# Patient Record
Sex: FEMALE | Race: WHITE | NOT HISPANIC OR LATINO | URBAN - METROPOLITAN AREA
[De-identification: names, ages, dates, MRNs, and addresses within clinical notes are randomized per-mention and may not be internally consistent; named-entity substitution may affect disease eponyms.]

---

## 2017-01-01 ENCOUNTER — EMERGENCY (EMERGENCY)
Facility: HOSPITAL | Age: 77
LOS: 0 days | Discharge: HOME | End: 2017-08-22
Attending: EMERGENCY MEDICINE | Admitting: EMERGENCY MEDICINE
Payer: MEDICARE

## 2017-01-01 ENCOUNTER — EMERGENCY (EMERGENCY)
Facility: HOSPITAL | Age: 77
LOS: 0 days | Discharge: HOME | End: 2017-11-29
Attending: EMERGENCY MEDICINE | Admitting: EMERGENCY MEDICINE
Payer: MEDICARE

## 2017-01-01 VITALS
SYSTOLIC BLOOD PRESSURE: 142 MMHG | DIASTOLIC BLOOD PRESSURE: 76 MMHG | RESPIRATION RATE: 20 BRPM | OXYGEN SATURATION: 96 % | HEIGHT: 64 IN | WEIGHT: 149.91 LBS | TEMPERATURE: 98 F | HEART RATE: 88 BPM

## 2017-01-01 VITALS
RESPIRATION RATE: 18 BRPM | TEMPERATURE: 98 F | SYSTOLIC BLOOD PRESSURE: 137 MMHG | OXYGEN SATURATION: 99 % | DIASTOLIC BLOOD PRESSURE: 74 MMHG | WEIGHT: 95.02 LBS | HEART RATE: 65 BPM

## 2017-01-01 VITALS
OXYGEN SATURATION: 98 % | RESPIRATION RATE: 17 BRPM | SYSTOLIC BLOOD PRESSURE: 149 MMHG | DIASTOLIC BLOOD PRESSURE: 78 MMHG | HEART RATE: 76 BPM

## 2017-01-01 VITALS
RESPIRATION RATE: 18 BRPM | HEART RATE: 77 BPM | SYSTOLIC BLOOD PRESSURE: 155 MMHG | OXYGEN SATURATION: 98 % | TEMPERATURE: 98 F | DIASTOLIC BLOOD PRESSURE: 76 MMHG

## 2017-01-01 DIAGNOSIS — S01.81XA LACERATION WITHOUT FOREIGN BODY OF OTHER PART OF HEAD, INITIAL ENCOUNTER: ICD-10-CM

## 2017-01-01 DIAGNOSIS — I10 ESSENTIAL (PRIMARY) HYPERTENSION: ICD-10-CM

## 2017-01-01 DIAGNOSIS — S09.90XA UNSPECIFIED INJURY OF HEAD, INITIAL ENCOUNTER: ICD-10-CM
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
## 2017-01-01 LAB
APPEARANCE UR: CLEAR — SIGNIFICANT CHANGE UP
BILIRUB UR-MCNC: NEGATIVE — SIGNIFICANT CHANGE UP
COLOR SPEC: YELLOW — SIGNIFICANT CHANGE UP
CULTURE RESULTS: SIGNIFICANT CHANGE UP
DIFF PNL FLD: (no result)
GLUCOSE UR QL: NEGATIVE MG/DL — SIGNIFICANT CHANGE UP
KETONES UR-MCNC: (no result)
LEUKOCYTE ESTERASE UR-ACNC: (no result)
NITRITE UR-MCNC: NEGATIVE — SIGNIFICANT CHANGE UP
PH UR: 5 — SIGNIFICANT CHANGE UP (ref 5–8)
PROT UR-MCNC: 30 MG/DL
SP GR SPEC: 1.02 — SIGNIFICANT CHANGE UP (ref 1.01–1.02)
SPECIMEN SOURCE: SIGNIFICANT CHANGE UP
UROBILINOGEN FLD QL: 1 MG/DL

## 2017-01-01 PROCEDURE — 72125 CT NECK SPINE W/O DYE: CPT | Mod: 26

## 2017-01-01 PROCEDURE — 70450 CT HEAD/BRAIN W/O DYE: CPT | Mod: 26

## 2017-01-01 PROCEDURE — 76377 3D RENDER W/INTRP POSTPROCES: CPT | Mod: 26

## 2017-01-01 PROCEDURE — 99284 EMERGENCY DEPT VISIT MOD MDM: CPT | Mod: 25

## 2017-01-01 PROCEDURE — 12011 RPR F/E/E/N/L/M 2.5 CM/<: CPT

## 2017-01-01 PROCEDURE — 70486 CT MAXILLOFACIAL W/O DYE: CPT | Mod: 26

## 2017-01-01 PROCEDURE — 99285 EMERGENCY DEPT VISIT HI MDM: CPT

## 2017-01-01 RX ORDER — AMLODIPINE BESYLATE 2.5 MG/1
2.5 TABLET ORAL ONCE
Qty: 0 | Refills: 0 | Status: COMPLETED | OUTPATIENT
Start: 2017-01-01 | End: 2017-01-01

## 2017-01-01 RX ORDER — TETANUS TOXOID, REDUCED DIPHTHERIA TOXOID AND ACELLULAR PERTUSSIS VACCINE, ADSORBED 5; 2.5; 8; 8; 2.5 [IU]/.5ML; [IU]/.5ML; UG/.5ML; UG/.5ML; UG/.5ML
0.5 SUSPENSION INTRAMUSCULAR ONCE
Qty: 0 | Refills: 0 | Status: COMPLETED | OUTPATIENT
Start: 2017-01-01 | End: 2017-01-01

## 2017-01-01 RX ORDER — CLONAZEPAM 1 MG
0.5 TABLET ORAL ONCE
Qty: 0 | Refills: 0 | Status: DISCONTINUED | OUTPATIENT
Start: 2017-01-01 | End: 2017-01-01

## 2017-01-01 RX ORDER — METOPROLOL TARTRATE 50 MG
25 TABLET ORAL DAILY
Qty: 0 | Refills: 0 | Status: DISCONTINUED | OUTPATIENT
Start: 2017-01-01 | End: 2017-01-01

## 2017-01-01 RX ORDER — DIVALPROEX SODIUM 500 MG/1
125 TABLET, DELAYED RELEASE ORAL ONCE
Qty: 0 | Refills: 0 | Status: COMPLETED | OUTPATIENT
Start: 2017-01-01 | End: 2017-01-01

## 2017-01-01 RX ORDER — DULOXETINE HYDROCHLORIDE 30 MG/1
60 CAPSULE, DELAYED RELEASE ORAL ONCE
Qty: 0 | Refills: 0 | Status: COMPLETED | OUTPATIENT
Start: 2017-01-01 | End: 2017-01-01

## 2017-01-01 RX ORDER — ASPIRIN/CALCIUM CARB/MAGNESIUM 324 MG
0 TABLET ORAL
Qty: 0 | Refills: 0 | COMMUNITY

## 2017-01-01 RX ORDER — GABAPENTIN 400 MG/1
100 CAPSULE ORAL ONCE
Qty: 0 | Refills: 0 | Status: COMPLETED | OUTPATIENT
Start: 2017-01-01 | End: 2017-01-01

## 2017-01-01 RX ORDER — AMLODIPINE BESYLATE 2.5 MG/1
1 TABLET ORAL
Qty: 0 | Refills: 0 | COMMUNITY

## 2017-01-01 RX ORDER — IBUPROFEN 200 MG
600 TABLET ORAL ONCE
Qty: 0 | Refills: 0 | Status: DISCONTINUED | OUTPATIENT
Start: 2017-01-01 | End: 2017-01-01

## 2017-01-01 RX ORDER — ASPIRIN/CALCIUM CARB/MAGNESIUM 324 MG
81 TABLET ORAL ONCE
Qty: 0 | Refills: 0 | Status: COMPLETED | OUTPATIENT
Start: 2017-01-01 | End: 2017-01-01

## 2017-01-01 RX ADMIN — Medication 25 MILLIGRAM(S): at 13:56

## 2017-01-01 RX ADMIN — Medication 0.5 MILLIGRAM(S): at 13:55

## 2017-01-01 RX ADMIN — TETANUS TOXOID, REDUCED DIPHTHERIA TOXOID AND ACELLULAR PERTUSSIS VACCINE, ADSORBED 0.5 MILLILITER(S): 5; 2.5; 8; 8; 2.5 SUSPENSION INTRAMUSCULAR at 17:56

## 2017-01-01 RX ADMIN — GABAPENTIN 100 MILLIGRAM(S): 400 CAPSULE ORAL at 13:56

## 2017-01-01 RX ADMIN — Medication 81 MILLIGRAM(S): at 14:00

## 2017-01-01 RX ADMIN — DULOXETINE HYDROCHLORIDE 60 MILLIGRAM(S): 30 CAPSULE, DELAYED RELEASE ORAL at 13:55

## 2017-01-01 RX ADMIN — AMLODIPINE BESYLATE 2.5 MILLIGRAM(S): 2.5 TABLET ORAL at 13:55

## 2017-01-01 RX ADMIN — DIVALPROEX SODIUM 125 MILLIGRAM(S): 500 TABLET, DELAYED RELEASE ORAL at 13:56

## 2017-08-08 ENCOUNTER — EMERGENCY (EMERGENCY)
Facility: HOSPITAL | Age: 77
LOS: 0 days | Discharge: HOME | End: 2017-08-08
Attending: EMERGENCY MEDICINE | Admitting: EMERGENCY MEDICINE
Payer: MEDICARE

## 2017-08-08 VITALS
RESPIRATION RATE: 16 BRPM | WEIGHT: 98.11 LBS | SYSTOLIC BLOOD PRESSURE: 120 MMHG | TEMPERATURE: 98 F | DIASTOLIC BLOOD PRESSURE: 76 MMHG | OXYGEN SATURATION: 98 % | HEART RATE: 71 BPM

## 2017-08-08 VITALS
TEMPERATURE: 98 F | HEART RATE: 62 BPM | SYSTOLIC BLOOD PRESSURE: 125 MMHG | RESPIRATION RATE: 17 BRPM | OXYGEN SATURATION: 97 % | DIASTOLIC BLOOD PRESSURE: 61 MMHG
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
## 2017-08-08 DIAGNOSIS — F41.9 ANXIETY DISORDER, UNSPECIFIED: ICD-10-CM

## 2017-08-08 DIAGNOSIS — S09.90XA UNSPECIFIED INJURY OF HEAD, INITIAL ENCOUNTER: ICD-10-CM

## 2017-08-08 DIAGNOSIS — I10 ESSENTIAL (PRIMARY) HYPERTENSION: ICD-10-CM
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
## 2017-08-08 PROCEDURE — 99285 EMERGENCY DEPT VISIT HI MDM: CPT

## 2017-08-08 PROCEDURE — 70450 CT HEAD/BRAIN W/O DYE: CPT | Mod: 26

## 2017-08-08 PROCEDURE — 93010 ELECTROCARDIOGRAM REPORT: CPT

## 2017-08-08 PROCEDURE — 72125 CT NECK SPINE W/O DYE: CPT | Mod: 26

## 2017-08-08 NOTE — ED ADULT NURSE NOTE - CHPI ED SYMPTOMS NEG
no vomiting/no weakness/no deformity/no loss of consciousness/no confusion/no numbness/no fever/no bleeding/no tingling/no abrasion

## 2017-08-08 NOTE — ED PROVIDER NOTE - OBJECTIVE STATEMENT
76 y/o female with PMHx of HTN, anxiety presents to the ED s/p unwitnessed fall on Sunday night (two days ago) in her nursing home. Pt tripped over wheelchair and fell, no preceding dizziness. She hit the back of her head lightly and does not present with any Sx. No neck pain, no new weakness, no dysuria, no frequency. On ASA. 78 y/o female with PMHx of HTN, anxiety presents to the ED s/p unwitnessed fall on Sunday night (two days ago) in her nursing home. Pt tripped over wheelchair and fell, no preceding dizziness. She hit the back of her head slightly and does not present with any Sx. No neck pain, no new weakness, no dysuria, no frequency. On ASA. Did not lose consciousness.

## 2017-08-08 NOTE — ED ADULT NURSE NOTE - OBJECTIVE STATEMENT
Pt is a 77y female, A & O x 3, VSS, presents to ED s/p fall Sunday night, pt states she fell out of wheelchair, states she was trying to get up, tripped and fell to ground, pt denies LOC, c/o pain in back of head initially, but has no complaints at this time, denies neck/back pain, denies chest pain, SOB, dizziness/weakness, N/V/D, pt is in no apparent distress, bed rails up, will continue to monitor.MD Peck aware of pt status.

## 2017-08-08 NOTE — ED PROVIDER NOTE - PLAN OF CARE
Follow up with your primary doctor tomorrow. Return to the Emergency Dept if you develop any new or worsening symptoms

## 2017-08-08 NOTE — ED ADULT NURSE REASSESSMENT NOTE - NS ED NURSE REASSESS COMMENT FT1
Care transferred from Shantell RN to myself, bedside report given. CT pending, pt resting comfortable in bed. Pt updated on plan of care. C/o of mild discomfort under right arm r/t fall

## 2017-08-08 NOTE — ED PROVIDER NOTE - PROGRESS NOTE DETAILS
Pt continues to be asymptomatic. CTs negative. EKG with LBBB. Spoke to PMD Dr. Quezada who believes it is old. Pt without any chest pain or dyspnea. Discussed plan of care and results with patient and family, comfortable with discharge plan, understands return instructions.

## 2017-08-08 NOTE — ED PROVIDER NOTE - MEDICAL DECISION MAKING DETAILS
77 year old female s/p minor head trauma 2 days ago sent in by nursing home for evaluation. Will do CT due to ASA use 77 year old female s/p minor head trauma 2 days ago sent in by nursing home for evaluation. Will do CT due to ASA use and EKG, reassess

## 2017-08-08 NOTE — ED ADULT TRIAGE NOTE - CHIEF COMPLAINT QUOTE
patient reported to family that she fell on sunday night and hit her head. patient is on an asa. no change in behavior. family concerned and wanted further evaluation  patietn resides at nursing home, h/o anxiety

## 2017-08-08 NOTE — ED PROVIDER NOTE - CARE PLAN
Principal Discharge DX:	Closed head injury, initial encounter Principal Discharge DX:	Closed head injury, initial encounter  Instructions for follow-up, activity and diet:	Follow up with your primary doctor tomorrow. Return to the Emergency Dept if you develop any new or worsening symptoms

## 2017-08-22 NOTE — ED ADULT NURSE REASSESSMENT NOTE - NS ED NURSE REASSESS COMMENT FT1
Report taken at the change of shift at bedside. pt confused with hx of dementia. Vitals stable. Son at bedside. No acute distress noted. Agree with previous RN's assessment. Will cont to monitor for safety and comfort.

## 2017-08-22 NOTE — ED ADULT NURSE REASSESSMENT NOTE - NS ED NURSE REASSESS COMMENT FT1
temp of 99.9 orally noted upon discharge. Called [facility] and spoked to Kimberly MELENDREZ (in charge of pt) and made aware. Dr. Harding made aware. 600mg Motrin given per md order. Pt d/cd in stable condition.

## 2017-08-22 NOTE — ED PROVIDER NOTE - OBJECTIVE STATEMENT
77 y-o Female with PMHx of HTN Presents to the ED BIB EMS S/P Fall. EMS personnel state PT fell from bed and hit her face and head. Pt currently confused, but knows her name. C/o facial and Head pain and chipped tooth. Unknown if ever smoked. PT takes Baby Asprin daily. Code trauma called by ED Attending Dr. Wright

## 2017-08-22 NOTE — ED PROVIDER NOTE - NEUROLOGICAL, MLM
Alert and oriented, no focal deficits, no motor or sensory deficits. Alert and oriented, no focal deficits, no motor or sensory deficits. Defuse weakness

## 2017-08-22 NOTE — ED PROVIDER NOTE - SKIN, MLM
Skin normal color for race, warm, dry. No evidence of rash. 2cm deep chin laceration, 1cm super fecial, bruising on nasal bridge. Skin normal color for race, warm, dry and intact. No evidence of rash. 2cm deep chin laceration, 1cm upper right eye superficial abrasion, bruising on nasal bridge

## 2017-11-29 NOTE — ED PROVIDER NOTE - SKIN, MLM
(+) 1.5 cm laceration to the Lt supra-orbital area, no active bleeding, no bony step-off, no septal hematoma, no hemotympanum, no intra-oral lesions.

## 2017-11-29 NOTE — ED PROVIDER NOTE - MUSCULOSKELETAL, MLM
Spine appears normal, range of motion is not limited and moving all extremities equally, no muscle or joint tenderness

## 2017-11-29 NOTE — ED PROVIDER NOTE - CONSTITUTIONAL, MLM
normal... Chronically ill appearing, confused but at baseline  awake, alert, answering all questions and only co ha.

## 2017-11-29 NOTE — ED PROCEDURE NOTE - CPROC ED POST PROC CARE GUIDE1
Instructed patient/caregiver regarding signs and symptoms of infection./instructions given to son/Verbal/written post procedure instructions were given to patient/caregiver./Instructed patient/caregiver to follow-up with primary care physician.

## 2017-11-29 NOTE — ED ADULT NURSE REASSESSMENT NOTE - NS ED NURSE REASSESS COMMENT FT1
Trauma alert cleared by Dr. Wang. VSS. Will continue monitoring patient. Trauma alert cleared by Dr. Wang. Pending SW consult in the AM. VSS. Will continue monitoring patient.

## 2017-11-29 NOTE — ED PROVIDER NOTE - PROGRESS NOTE DETAILS
Attending Mireya: Rec s/o pendinf RHIANNON salas. SW spoke withpts facility ([facility]) and SW there - cannot be placed into new facility from ED at this time, however pt can go back to [facility] and son will d/w SW there about transferring facility

## 2017-11-29 NOTE — ED PROVIDER NOTE - OBJECTIVE STATEMENT
77 F BIBA from NH for eval after a fall.  PT states that she tripped and fell while attempting to go to the bathroom.  CO HA, no neck pain, cp, sob, abd pain, fever, nvd.

## 2018-01-01 ENCOUNTER — INPATIENT (INPATIENT)
Facility: HOSPITAL | Age: 78
LOS: 0 days | End: 2018-08-20
Attending: SURGERY | Admitting: SURGERY
Payer: MEDICARE

## 2018-01-01 VITALS
RESPIRATION RATE: 12 BRPM | HEART RATE: 116 BPM | DIASTOLIC BLOOD PRESSURE: 69 MMHG | SYSTOLIC BLOOD PRESSURE: 125 MMHG | OXYGEN SATURATION: 100 %

## 2018-01-01 VITALS — RESPIRATION RATE: 12 BRPM

## 2018-01-01 DIAGNOSIS — J96.02 ACUTE RESPIRATORY FAILURE WITH HYPERCAPNIA: ICD-10-CM

## 2018-01-01 DIAGNOSIS — E87.2 ACIDOSIS: ICD-10-CM

## 2018-01-01 DIAGNOSIS — G93.1 ANOXIC BRAIN DAMAGE, NOT ELSEWHERE CLASSIFIED: ICD-10-CM

## 2018-01-01 DIAGNOSIS — I46.9 CARDIAC ARREST, CAUSE UNSPECIFIED: ICD-10-CM

## 2018-01-01 DIAGNOSIS — R40.2432 GLASGOW COMA SCALE SCORE 3-8, AT ARRIVAL TO EMERGENCY DEPARTMENT: ICD-10-CM

## 2018-01-01 DIAGNOSIS — I21.9 ACUTE MYOCARDIAL INFARCTION, UNSPECIFIED: ICD-10-CM

## 2018-01-01 DIAGNOSIS — J96.01 ACUTE RESPIRATORY FAILURE WITH HYPOXIA: ICD-10-CM

## 2018-01-01 DIAGNOSIS — F41.9 ANXIETY DISORDER, UNSPECIFIED: ICD-10-CM

## 2018-01-01 DIAGNOSIS — I10 ESSENTIAL (PRIMARY) HYPERTENSION: ICD-10-CM

## 2018-01-01 LAB
ABO RH CONFIRMATION: SIGNIFICANT CHANGE UP
ALBUMIN SERPL ELPH-MCNC: 2.9 G/DL — LOW (ref 3.3–5)
ALP SERPL-CCNC: 100 U/L — SIGNIFICANT CHANGE UP (ref 40–120)
ALT FLD-CCNC: 47 U/L — SIGNIFICANT CHANGE UP (ref 12–78)
ANION GAP SERPL CALC-SCNC: 21 MMOL/L — HIGH (ref 5–17)
APPEARANCE UR: ABNORMAL
APTT BLD: 61.2 SEC — HIGH (ref 27.5–37.4)
AST SERPL-CCNC: 71 U/L — HIGH (ref 15–37)
BACTERIA # UR AUTO: ABNORMAL
BASE EXCESS BLDA CALC-SCNC: -14 MMOL/L — LOW (ref -2–2)
BASOPHILS # BLD AUTO: 0 K/UL — SIGNIFICANT CHANGE UP (ref 0–0.2)
BASOPHILS NFR BLD AUTO: 0 % — SIGNIFICANT CHANGE UP (ref 0–2)
BILIRUB DIRECT SERPL-MCNC: <0.1 MG/DL — SIGNIFICANT CHANGE UP (ref 0–0.2)
BILIRUB INDIRECT FLD-MCNC: >0.2 MG/DL — SIGNIFICANT CHANGE UP (ref 0.2–1)
BILIRUB SERPL-MCNC: 0.3 MG/DL — SIGNIFICANT CHANGE UP (ref 0.2–1.2)
BILIRUB SERPL-MCNC: 0.3 MG/DL — SIGNIFICANT CHANGE UP (ref 0.2–1.2)
BILIRUB UR-MCNC: NEGATIVE — SIGNIFICANT CHANGE UP
BLD GP AB SCN SERPL QL: SIGNIFICANT CHANGE UP
BUN SERPL-MCNC: 25 MG/DL — HIGH (ref 7–23)
CALCIUM SERPL-MCNC: 8.9 MG/DL — SIGNIFICANT CHANGE UP (ref 8.5–10.1)
CHLORIDE SERPL-SCNC: 105 MMOL/L — SIGNIFICANT CHANGE UP (ref 96–108)
CO2 SERPL-SCNC: 16 MMOL/L — LOW (ref 22–31)
COLOR SPEC: YELLOW — SIGNIFICANT CHANGE UP
CREAT SERPL-MCNC: 1.34 MG/DL — HIGH (ref 0.5–1.3)
DIFF PNL FLD: ABNORMAL
EOSINOPHIL # BLD AUTO: 0.34 K/UL — SIGNIFICANT CHANGE UP (ref 0–0.5)
EOSINOPHIL NFR BLD AUTO: 1 % — SIGNIFICANT CHANGE UP (ref 0–6)
EPI CELLS # UR: ABNORMAL
GLUCOSE BLDC GLUCOMTR-MCNC: 138 MG/DL — HIGH (ref 70–99)
GLUCOSE SERPL-MCNC: 238 MG/DL — HIGH (ref 70–99)
GLUCOSE UR QL: 100 MG/DL
HCO3 BLDA-SCNC: 13 MMOL/L — LOW (ref 21–29)
HCT VFR BLD CALC: 39.3 % — SIGNIFICANT CHANGE UP (ref 34.5–45)
HGB BLD-MCNC: 11.9 G/DL — SIGNIFICANT CHANGE UP (ref 11.5–15.5)
INR BLD: 1.18 RATIO — HIGH (ref 0.88–1.16)
KETONES UR-MCNC: NEGATIVE — SIGNIFICANT CHANGE UP
LACTATE SERPL-SCNC: 11.4 MMOL/L — CRITICAL HIGH (ref 0.7–2)
LACTATE SERPL-SCNC: 11.7 MMOL/L — CRITICAL HIGH (ref 0.7–2)
LEUKOCYTE ESTERASE UR-ACNC: ABNORMAL
LIDOCAIN IGE QN: 236 U/L — SIGNIFICANT CHANGE UP (ref 73–393)
LYMPHOCYTES # BLD AUTO: 23.99 K/UL — HIGH (ref 1–3.3)
LYMPHOCYTES # BLD AUTO: 71 % — HIGH (ref 13–44)
MAGNESIUM SERPL-MCNC: 2.5 MG/DL — SIGNIFICANT CHANGE UP (ref 1.6–2.6)
MANUAL SMEAR VERIFICATION: SIGNIFICANT CHANGE UP
MCHC RBC-ENTMCNC: 30.3 GM/DL — LOW (ref 32–36)
MCHC RBC-ENTMCNC: 32.2 PG — SIGNIFICANT CHANGE UP (ref 27–34)
MCV RBC AUTO: 106.2 FL — HIGH (ref 80–100)
MONOCYTES # BLD AUTO: 1.01 K/UL — HIGH (ref 0–0.9)
MONOCYTES NFR BLD AUTO: 3 % — SIGNIFICANT CHANGE UP (ref 2–14)
NEUTROPHILS # BLD AUTO: 8.45 K/UL — HIGH (ref 1.8–7.4)
NEUTROPHILS NFR BLD AUTO: 25 % — LOW (ref 43–77)
NITRITE UR-MCNC: NEGATIVE — SIGNIFICANT CHANGE UP
NRBC # BLD: 0 /100 WBCS — SIGNIFICANT CHANGE UP (ref 0–0)
NRBC # BLD: 0 /100 — SIGNIFICANT CHANGE UP (ref 0–0)
NT-PROBNP SERPL-SCNC: 1810 PG/ML — HIGH (ref 0–450)
PCO2 BLDA: 39 MMHG — SIGNIFICANT CHANGE UP (ref 32–46)
PH BLDA: 7.16 — CRITICAL LOW (ref 7.35–7.45)
PH UR: 8 — SIGNIFICANT CHANGE UP (ref 5–8)
PLAT MORPH BLD: NORMAL — SIGNIFICANT CHANGE UP
PLATELET # BLD AUTO: 174 K/UL — SIGNIFICANT CHANGE UP (ref 150–400)
PLATELET COUNT - ESTIMATE: NORMAL — SIGNIFICANT CHANGE UP
PO2 BLDA: 549 MMHG — HIGH (ref 74–108)
POTASSIUM SERPL-MCNC: 5.8 MMOL/L — HIGH (ref 3.5–5.3)
POTASSIUM SERPL-SCNC: 5.8 MMOL/L — HIGH (ref 3.5–5.3)
PROT SERPL-MCNC: 6.8 GM/DL — SIGNIFICANT CHANGE UP (ref 6–8.3)
PROT UR-MCNC: 500 MG/DL
PROTHROM AB SERPL-ACNC: 12.8 SEC — HIGH (ref 9.8–12.7)
RBC # BLD: 3.7 M/UL — LOW (ref 3.8–5.2)
RBC # FLD: 13.6 % — SIGNIFICANT CHANGE UP (ref 10.3–14.5)
RBC BLD AUTO: SIGNIFICANT CHANGE UP
RBC CASTS # UR COMP ASSIST: ABNORMAL /HPF (ref 0–4)
SAO2 % BLDA: 100 % — HIGH (ref 92–96)
SODIUM SERPL-SCNC: 142 MMOL/L — SIGNIFICANT CHANGE UP (ref 135–145)
SP GR SPEC: 1.01 — SIGNIFICANT CHANGE UP (ref 1.01–1.02)
TROPONIN I SERPL-MCNC: 2.47 NG/ML — HIGH (ref 0.01–0.04)
TROPONIN I SERPL-MCNC: 70.9 NG/ML — HIGH (ref 0.01–0.04)
TSH SERPL-MCNC: 11.6 UU/ML — HIGH (ref 0.34–4.82)
TYPE + AB SCN PNL BLD: SIGNIFICANT CHANGE UP
UROBILINOGEN FLD QL: NEGATIVE MG/DL — SIGNIFICANT CHANGE UP
WBC # BLD: 33.79 K/UL — HIGH (ref 3.8–10.5)
WBC # FLD AUTO: 33.79 K/UL — HIGH (ref 3.8–10.5)
WBC UR QL: >50

## 2018-01-01 PROCEDURE — 70450 CT HEAD/BRAIN W/O DYE: CPT | Mod: 26

## 2018-01-01 PROCEDURE — 99238 HOSP IP/OBS DSCHRG MGMT 30/<: CPT

## 2018-01-01 PROCEDURE — 99291 CRITICAL CARE FIRST HOUR: CPT

## 2018-01-01 PROCEDURE — 71045 X-RAY EXAM CHEST 1 VIEW: CPT | Mod: 26

## 2018-01-01 PROCEDURE — 93010 ELECTROCARDIOGRAM REPORT: CPT

## 2018-01-01 PROCEDURE — 99223 1ST HOSP IP/OBS HIGH 75: CPT

## 2018-01-01 RX ORDER — NOREPINEPHRINE BITARTRATE/D5W 8 MG/250ML
0.05 PLASTIC BAG, INJECTION (ML) INTRAVENOUS
Qty: 8 | Refills: 0 | Status: DISCONTINUED | OUTPATIENT
Start: 2018-01-01 | End: 2018-01-01

## 2018-01-01 RX ORDER — DIVALPROEX SODIUM 500 MG/1
0.5 TABLET, DELAYED RELEASE ORAL
Qty: 0 | Refills: 0 | COMMUNITY

## 2018-01-01 RX ORDER — PHENYLEPHRINE HYDROCHLORIDE 10 MG/ML
0.5 INJECTION INTRAVENOUS
Qty: 40 | Refills: 0 | Status: DISCONTINUED | OUTPATIENT
Start: 2018-01-01 | End: 2018-01-01

## 2018-01-01 RX ORDER — SODIUM CHLORIDE 9 MG/ML
2000 INJECTION INTRAMUSCULAR; INTRAVENOUS; SUBCUTANEOUS ONCE
Qty: 0 | Refills: 0 | Status: COMPLETED | OUTPATIENT
Start: 2018-01-01 | End: 2018-01-01

## 2018-01-01 RX ORDER — PIPERACILLIN AND TAZOBACTAM 4; .5 G/20ML; G/20ML
3.38 INJECTION, POWDER, LYOPHILIZED, FOR SOLUTION INTRAVENOUS ONCE
Qty: 0 | Refills: 0 | Status: COMPLETED | OUTPATIENT
Start: 2018-01-01 | End: 2018-01-01

## 2018-01-01 RX ORDER — SODIUM CHLORIDE 9 MG/ML
1000 INJECTION INTRAMUSCULAR; INTRAVENOUS; SUBCUTANEOUS
Qty: 0 | Refills: 0 | Status: DISCONTINUED | OUTPATIENT
Start: 2018-01-01 | End: 2018-01-01

## 2018-01-01 RX ORDER — SODIUM BICARBONATE 1 MEQ/ML
50 SYRINGE (ML) INTRAVENOUS ONCE
Qty: 0 | Refills: 0 | Status: COMPLETED | OUTPATIENT
Start: 2018-01-01 | End: 2018-01-01

## 2018-01-01 RX ORDER — ATORVASTATIN CALCIUM 80 MG/1
1 TABLET, FILM COATED ORAL
Qty: 0 | Refills: 0 | COMMUNITY

## 2018-01-01 RX ORDER — CLONAZEPAM 1 MG
1 TABLET ORAL
Qty: 0 | Refills: 0 | COMMUNITY

## 2018-01-01 RX ORDER — MAGNESIUM HYDROXIDE 400 MG/1
0 TABLET, CHEWABLE ORAL
Qty: 0 | Refills: 0 | COMMUNITY

## 2018-01-01 RX ORDER — GABAPENTIN 400 MG/1
1 CAPSULE ORAL
Qty: 0 | Refills: 0 | COMMUNITY

## 2018-01-01 RX ORDER — METOPROLOL TARTRATE 50 MG
0.5 TABLET ORAL
Qty: 0 | Refills: 0 | COMMUNITY

## 2018-01-01 RX ORDER — VANCOMYCIN HCL 1 G
1000 VIAL (EA) INTRAVENOUS ONCE
Qty: 0 | Refills: 0 | Status: COMPLETED | OUTPATIENT
Start: 2018-01-01 | End: 2018-01-01

## 2018-01-01 RX ORDER — POLYETHYLENE GLYCOL 3350 17 G/17G
0 POWDER, FOR SOLUTION ORAL
Qty: 0 | Refills: 0 | COMMUNITY

## 2018-01-01 RX ORDER — PANTOPRAZOLE SODIUM 20 MG/1
40 TABLET, DELAYED RELEASE ORAL DAILY
Qty: 0 | Refills: 0 | Status: DISCONTINUED | OUTPATIENT
Start: 2018-01-01 | End: 2018-01-01

## 2018-01-01 RX ORDER — DULOXETINE HYDROCHLORIDE 30 MG/1
1 CAPSULE, DELAYED RELEASE ORAL
Qty: 0 | Refills: 0 | COMMUNITY

## 2018-01-01 RX ORDER — CHOLECALCIFEROL (VITAMIN D3) 125 MCG
1 CAPSULE ORAL
Qty: 0 | Refills: 0 | COMMUNITY

## 2018-01-01 RX ORDER — LINACLOTIDE 145 UG/1
1 CAPSULE, GELATIN COATED ORAL
Qty: 0 | Refills: 0 | COMMUNITY

## 2018-01-01 RX ORDER — ASPIRIN/CALCIUM CARB/MAGNESIUM 324 MG
300 TABLET ORAL ONCE
Qty: 0 | Refills: 0 | Status: COMPLETED | OUTPATIENT
Start: 2018-01-01 | End: 2018-01-01

## 2018-01-01 RX ORDER — ASPIRIN/CALCIUM CARB/MAGNESIUM 324 MG
1 TABLET ORAL
Qty: 0 | Refills: 0 | COMMUNITY

## 2018-01-01 RX ADMIN — SODIUM CHLORIDE 100 MILLILITER(S): 9 INJECTION INTRAMUSCULAR; INTRAVENOUS; SUBCUTANEOUS at 21:46

## 2018-01-01 RX ADMIN — Medication 4.93 MICROGRAM(S)/KG/MIN: at 19:14

## 2018-01-01 RX ADMIN — Medication 300 MILLIGRAM(S): at 22:19

## 2018-01-01 RX ADMIN — Medication 50 MILLIEQUIVALENT(S): at 19:38

## 2018-01-01 RX ADMIN — Medication 50 MILLIEQUIVALENT(S): at 19:35

## 2018-01-01 RX ADMIN — SODIUM CHLORIDE 2000 MILLILITER(S): 9 INJECTION INTRAMUSCULAR; INTRAVENOUS; SUBCUTANEOUS at 19:14

## 2018-01-01 RX ADMIN — PIPERACILLIN AND TAZOBACTAM 200 GRAM(S): 4; .5 INJECTION, POWDER, LYOPHILIZED, FOR SOLUTION INTRAVENOUS at 19:45

## 2018-01-01 RX ADMIN — SODIUM CHLORIDE 2000 MILLILITER(S): 9 INJECTION INTRAMUSCULAR; INTRAVENOUS; SUBCUTANEOUS at 19:45

## 2018-01-01 RX ADMIN — PHENYLEPHRINE HYDROCHLORIDE 9.86 MICROGRAM(S)/KG/MIN: 10 INJECTION INTRAVENOUS at 23:30

## 2018-01-01 RX ADMIN — Medication 250 MILLIGRAM(S): at 20:05

## 2018-01-01 RX ADMIN — PANTOPRAZOLE SODIUM 40 MILLIGRAM(S): 20 TABLET, DELAYED RELEASE ORAL at 21:46

## 2018-08-19 NOTE — ED PROVIDER NOTE - MEDICAL DECISION MAKING DETAILS
79 y/o f with cardiac arrest with ROSC, intubated, labs, CXR, admit. 77 y/o f with cardiac arrest with ROSC, intubated, prognosis guarded; ekg, cxr, labs, admit

## 2018-08-19 NOTE — ED PROVIDER NOTE - PROGRESS NOTE DETAILS
Case discussed with Dr. Massey, Dr. Saha will come to ER to evaluate pt, pt will be admitted to ICU. Jean-Claude FLOWER: Multiple conversations discussed with family at bedside- patient is full code per patient; discussed with family- patient's guarded prognosis; hypotensive at this time; peripheral pressors started; pending ICU consult for admission.

## 2018-08-19 NOTE — ED ADULT NURSE NOTE - ED STAT RN HANDOFF DETAILS
Received report from PARVIN Farley and reassessed patient. Agree with the previous RN assessment. Bedside report performed. Patient is intubated, RR 12, Tidal 500, FiO2 100%. Patient is tolerating ET without sedation, no exam findings of pain or discomfort. Pupils appear fixed and non-reactive. No response to application of tactile/discomforting stimuli. No urinary drainage from Alexander. IVF infusing as per order. Levophed to be started as per order. ED MD Michael at bedside. Family at bedside. Extensive family education on patient's status and plan of care performed by PARVIN Marvin, PARVIN LUCERO and MD Michael. Will continue to remain at bedside for monitoring and interventions.

## 2018-08-19 NOTE — ED ADULT NURSE REASSESSMENT NOTE - NS ED NURSE REASSESS COMMENT FT1
code blue flowsheet initiated upon arrival. pt intubated by MD Bermeo- 7.5 ET, 23 at the [facility]. PIV placed to L AC, labs drawn and sent. IO removed. 18 Fr smith placed, no output as of yet. pt hypotensive to 46/35, additional PIV placed, 2L NS bolus initiated, levophed drip initiated, titrated to 0.08 mcg/kg/min. care of pt given to RN Max. family at bedside. family discussing advance directives. pt full code currently. ABG being drawn by phlebotomy. phlebotomist called for outstanding cx's/lactate.

## 2018-08-19 NOTE — H&P ADULT - HISTORY OF PRESENT ILLNESS
· Chief Complaint: The patient is a 78y Female complaining of	  · Unable to Obtain: Severe Illness/Injury	  · Details: cardiac arrest	  · HPI Objective Statement: 77 y/o f with PMHx of HTN, anxiety presenting to the ED BIBA from NH in cardiac arrest. Found with agonal breathing. EMS called at 17:20. CPR already in progress when EMS arrived at scene, CPR taken over by EMS; 3 rounds of Epi administered by EMS with return of pulses. Pt required immediate response upon arrival.	  Patient is intubated, ventilated, unresponsive at the time of exam.  Inotropic support required.    ICU Vital Signs Last 24 Hrs  T(C): 37.2 (19 Aug 2018 18:15), Max: 37.2 (19 Aug 2018 18:15)  T(F): 98.9 (19 Aug 2018 18:15), Max: 98.9 (19 Aug 2018 18:15)  HR: 89 (19 Aug 2018 20:00) (83 - 132)  BP: 97/61 (19 Aug 2018 20:00) (49/32 - 160/83)  BP(mean): 60 (19 Aug 2018 19:42) (54 - 62)  ABP: --  ABP(mean): --  RR: 12 (19 Aug 2018 20:00) (12 - 18)  SpO2: 100% (19 Aug 2018 20:00) (97% - 100%)

## 2018-08-19 NOTE — ED ADULT NURSE NOTE - OBJECTIVE STATEMENT
pt BIBEMS from [facility] unresponsive s/p witnessed arrest. CPR initiated by NH staff and ACLS continued by EMS upon arrival. IO placed to R tibia. pt in PEA upon EMS arrival. epinephrine administered x3. Devyn in place administering compressions upon arrival to ED. see code blue paper flowsheet for further documentation.

## 2018-08-19 NOTE — ED ADULT NURSE NOTE - NSIMPLEMENTINTERV_GEN_ALL_ED
Implemented All Fall with Harm Risk Interventions:  [place] to call system. Call bell, personal items and telephone within reach. Instruct patient to call for assistance. Room bathroom lighting operational. Non-slip footwear when patient is off stretcher. Physically safe environment: no spills, clutter or unnecessary equipment. Stretcher in lowest position, wheels locked, appropriate side rails in place. Provide visual cue, wrist band, yellow gown, etc. Monitor gait and stability. Monitor for mental status changes and reorient to person, place, and time. Review medications for side effects contributing to fall risk. Reinforce activity limits and safety measures with patient and family. Provide visual clues: red socks.

## 2018-08-19 NOTE — ED PROVIDER NOTE - OBJECTIVE STATEMENT
79 y/o f with PMHx of HTN, anxiety presenting to the ED BIBA from NH in cardiac arrest. Found with agonal breathing. EMS called at 17:20. CPR already in progress when EMS arrived at scene, CPR taken over by EMS and TATIANNA placed. 3 rounds of Epi administered by EMS with return of pulses. Pt required immediate response upon arrival. 79 y/o f with PMHx of HTN, anxiety presenting to the ED BIBA from NH in cardiac arrest. Found with agonal breathing. EMS called at 17:20. CPR already in progress when EMS arrived at scene, CPR taken over by EMS; 3 rounds of Epi administered by EMS with return of pulses. Pt required immediate response upon arrival.

## 2018-08-19 NOTE — ED ADULT NURSE NOTE - NS ED NURSE TRANSPORT WITH
"IV/IV pump/drains/pulse ox/suction/oxygen/ventilator/cardiac monitor

## 2018-08-19 NOTE — ED PROVIDER NOTE - ENMT, MLM
Nasal mucosa clear. +no gag reflex. Throat has no vesicles, no oropharyngeal exudates and uvula is midline.

## 2018-08-19 NOTE — ED ADULT NURSE REASSESSMENT NOTE - NS ED NURSE REASSESS COMMENT FT1
2000: ICU RN Yana at bedside. Patient transported to CT scan on monitor. Levophed titrated up to 0.1 mcg/kg/min. FiO2 decreased to 50%, SPO2 remains at 100%. Bedside handoff given to ICU PARVIN Millan.

## 2018-08-19 NOTE — ED ADULT NURSE REASSESSMENT NOTE - NS ED NURSE REASSESS COMMENT FT1
Urine collected and sent prior to IV Abx from smith catheter as per MD order. FiO2 decreased to 75% and pt tolerating well at this time. Family remains at bedside and pt on bedside monitor. RN on phone with ICU RN to give report at this time. Will continue to monitor.

## 2018-08-19 NOTE — ED ADULT NURSE REASSESSMENT NOTE - NS ED NURSE REASSESS COMMENT FT1
Family of pt at bedside and updated in regards to plan of care. Spoke with MD Massey at 710PM in regards to norepi infusing and dosing. OG tube inserted as per MD order, dentures (top set) given to family in denture cup, pt did not arrive with bottom set of dentures. MD Saha arrived at pt bedside at this time (733PM) assessing pt for ICU admission. Phlebotomy at pt bedside at this time drawing blood cultures, IV Abx to be hung after BC. MD Saha accepted pt to ICU at this time, awaiting orders. Pt remains on bedside monitor at this time with family at bedside, family verbalized understanding of need for ICU admission. Will continue to monitor.

## 2018-08-20 PROBLEM — F41.9 ANXIETY DISORDER, UNSPECIFIED: Chronic | Status: ACTIVE | Noted: 2017-08-08

## 2018-08-20 PROBLEM — I10 ESSENTIAL (PRIMARY) HYPERTENSION: Chronic | Status: ACTIVE | Noted: 2017-08-08

## 2018-08-20 NOTE — DISCHARGE NOTE FOR THE EXPIRED PATIENT - HOSPITAL COURSE
Presented to ER anoxic, fixed and dilated then progressed to death following an unwittnessed arrest in the Nursing home.

## 2018-08-25 LAB
CULTURE RESULTS: SIGNIFICANT CHANGE UP
CULTURE RESULTS: SIGNIFICANT CHANGE UP
SPECIMEN SOURCE: SIGNIFICANT CHANGE UP
SPECIMEN SOURCE: SIGNIFICANT CHANGE UP

## 2018-08-27 NOTE — CDI QUERY NOTE - NSCDIOTHERTXTBX2_GEN_ALL_CORE_FT
Patient admitted in cardiac arrest from SNF, CPR initiated.    Troponin levels noted to be 2.470 > 70.900    Please clarify if the above labs are indicative of a diagnosis ?  a) Suspected NSTEMI ?  b) Likely STEMI ?  c) Demand ischemia ?  d) Other ? Please specify the diagnosis ?  e) Lab values are not clinically significant ?

## 2018-09-25 NOTE — CDI QUERY NOTE - NSCDI_DOCCLARIFY_GEN_ALL_CORE_FT
In responding to this request, please exercise your independent professional judgment.  The fact that a question is asked does not imply that any particular answer is desired or expected.
In responding to this request, please exercise your independent professional judgment.  The fact that a question is asked does not imply that any particular answer is desired or expected.

## 2018-09-25 NOTE — CDI QUERY NOTE - NSCDINOTEDOCCLARIFICATION_GEN_A_CORE
PLEASE INCLUDE MORE SPECIFIC DOCUMENTATION IN YOUR PROGRESS NOTE AND DISCHARGE SUMMARY.  The documentation in this patient's medical record requires additional clarification to ensure we accurately capture the patients diagnosis(es), treatment and/or severity of illness. Please document to the greatest level of specificity all corresponding diagnoses (either known or suspected) and/or treatment associated with the clinical information described below.
PLEASE INCLUDE MORE SPECIFIC DOCUMENTATION IN YOUR PROGRESS NOTE AND DISCHARGE SUMMARY.  The documentation in this patient's medical record requires additional clarification to ensure we accurately capture the patients diagnosis(es), treatment and/or severity of illness. Please document to the greatest level of specificity all corresponding diagnoses (either known or suspected) and/or treatment associated with the clinical information described below.

## 2018-09-25 NOTE — CDI QUERY NOTE - NSCDINOTEPOA_GEN_ALL_CORE_FT
Was the condition present on admission, if so, please document in the chart that “(the condition) was present on admission.”
Was the condition present on admission, if so, please document in the chart that “(the condition) was present on admission.”

## 2018-09-25 NOTE — CDI QUERY NOTE - NSCDI_DOCCLARIFY2_GEN_ALL_CORE_FT
Documentation clarification is required for compliance, accuracy in coding and billing, claim validation and reporting severity of illness, quality data and risk of mortality.
Documentation clarification is required for compliance, accuracy in coding and billing, claim validation and reporting severity of illness, quality data and risk of mortality.

## 2018-11-18 NOTE — ED ADULT NURSE NOTE - NSSISCREENINGQ3_ED_A_ED
Gen: No acute distress, alert, cooperative  Head: Normocephalic, Atraumatic  HEENT: PERRL, oral mucosa moist, normal conjunctiva, ocular motion intact all quadrants  Lung: CTAB, no respiratory distress, no crackles or wheezes  CV: rrr, no murmur  Abd: soft, NTND, no rebound or guarding  MSK: No LE edema  Neuro: Speech sounds mildly slurred, no repetition. Normal strength and sensation throughout. Cranial nerves intact otherwise(slurred speech). Finger to nose normal. No pronator drift, no facial droop  Skin: Warm and dry, no evidence of rash   Psych: normal affect, follows commands No

## 2020-12-10 NOTE — ED PROVIDER NOTE - NS ED SCRIBE STATEMENT
[No Varicosities] : no varicosities [No Edema] : there was no peripheral edema [No Extremity Clubbing/Cyanosis] : no extremity clubbing/cyanosis [Normal Posterior Cervical Nodes] : no posterior cervical lymphadenopathy Attending [Normal Anterior Cervical Nodes] : no anterior cervical lymphadenopathy [Normal] : affect was normal and insight and judgment were intact

## 2021-02-08 NOTE — ED ADULT NURSE NOTE - PRIMARY CARE PROVIDER
Ernestina Santana with [facility] calling, states that they did Pt Eval on patient today. Will fax plan of care orders/ asking if you will sign?     Call [phone]
[place]

## 2023-06-27 NOTE — CDI QUERY NOTE - NSCDIOTHERTXTBX_GEN_ALL_CORE_HH
PT on the phone with Intake [id number]  
Patient admitted  and      Lactate level noted to be 11.7 > 11.4    Please clarify if this lab value is indicative of a diagnosis ?  a) Lactic acidosis ?  b) lacticemia, excessive ?  c) Other ? Please specify the diagnosis ?  d) Labs are not clinically significant ?
Per documentation : On 8/19  presenting to the ED BIBA from NH in cardiac arrest. Found with agonal breathing.   Acute respiratory failure with hypoxia and hypercapnia.- vent support  The patient on admission felt to be demand ischemia which progressed to myocardial infarction during the resuscitation phase and clinical deterioration leading to expiration on 8/20  Principal Discharge listed as  Cardiac arrest.    Can the underlying etiology of the cardiac arrest be further clarified ?  a) Cardiac arrest suspected to be secondary to acute respiratory failure  b) Cardiac arrest suspected to be related to myocardial infarction  c) Unable to determine the etiology of the Cardiac arrest  d) Other clinical diagnosis for the suspected etiology of the cardiac arrest, please clarify
